# Patient Record
Sex: FEMALE | Race: WHITE | NOT HISPANIC OR LATINO | Employment: FULL TIME | URBAN - METROPOLITAN AREA
[De-identification: names, ages, dates, MRNs, and addresses within clinical notes are randomized per-mention and may not be internally consistent; named-entity substitution may affect disease eponyms.]

---

## 2022-09-23 ENCOUNTER — LAB REQUISITION (OUTPATIENT)
Dept: LAB | Facility: CLINIC | Age: 67
End: 2022-09-23
Payer: COMMERCIAL

## 2022-09-23 DIAGNOSIS — M20.12 HALLUX VALGUS (ACQUIRED), LEFT FOOT: ICD-10-CM

## 2022-09-23 PROCEDURE — 88304 TISSUE EXAM BY PATHOLOGIST: CPT | Mod: 26 | Performed by: PATHOLOGY

## 2022-09-23 PROCEDURE — 88304 TISSUE EXAM BY PATHOLOGIST: CPT | Performed by: PATHOLOGY

## 2022-09-23 PROCEDURE — 88304 TISSUE EXAM BY PATHOLOGIST: CPT | Mod: TC,ORL | Performed by: PODIATRIST

## 2022-09-27 LAB
PATH REPORT.COMMENTS IMP SPEC: NORMAL
PATH REPORT.COMMENTS IMP SPEC: NORMAL
PATH REPORT.FINAL DX SPEC: NORMAL
PATH REPORT.GROSS SPEC: NORMAL
PATH REPORT.MICROSCOPIC SPEC OTHER STN: NORMAL
PATH REPORT.RELEVANT HX SPEC: NORMAL
PHOTO IMAGE: NORMAL

## 2024-05-01 ENCOUNTER — LAB REQUISITION (OUTPATIENT)
Dept: LAB | Facility: CLINIC | Age: 69
End: 2024-05-01
Payer: COMMERCIAL

## 2024-05-01 DIAGNOSIS — Z79.899 OTHER LONG TERM (CURRENT) DRUG THERAPY: ICD-10-CM

## 2024-05-01 PROCEDURE — 87186 SC STD MICRODIL/AGAR DIL: CPT | Mod: ORL | Performed by: DERMATOLOGY

## 2024-05-04 LAB
BACTERIA WND CULT: ABNORMAL
BACTERIA WND CULT: ABNORMAL

## 2024-09-15 ENCOUNTER — HOSPITAL ENCOUNTER (EMERGENCY)
Facility: CLINIC | Age: 69
Discharge: HOME | End: 2024-09-15
Payer: COMMERCIAL

## 2024-09-15 VITALS
TEMPERATURE: 98.6 F | BODY MASS INDEX: 25.76 KG/M2 | OXYGEN SATURATION: 95 % | HEART RATE: 75 BPM | RESPIRATION RATE: 17 BRPM | DIASTOLIC BLOOD PRESSURE: 92 MMHG | HEIGHT: 68 IN | WEIGHT: 170 LBS | SYSTOLIC BLOOD PRESSURE: 158 MMHG

## 2024-09-15 DIAGNOSIS — S29.9XXD CHEST WALL INJURY, SUBSEQUENT ENCOUNTER: ICD-10-CM

## 2024-09-15 LAB
ALBUMIN UR-MCNC: NEGATIVE MG/DL
APPEARANCE UR: CLEAR
BILIRUB UR QL STRIP: NEGATIVE
COLOR UR AUTO: ABNORMAL
GLUCOSE UR STRIP-MCNC: NEGATIVE MG/DL
HGB UR QL STRIP: NEGATIVE
KETONES UR STRIP-MCNC: NEGATIVE MG/DL
LEUKOCYTE ESTERASE UR QL STRIP: ABNORMAL
NITRATE UR QL: NEGATIVE
PH UR STRIP: 5.5 [PH] (ref 5–7)
RBC URINE: 1 /HPF
SP GR UR STRIP: 1.01 (ref 1–1.03)
SQUAMOUS EPITHELIAL: <1 /HPF
UROBILINOGEN UR STRIP-MCNC: NORMAL MG/DL
WBC URINE: 4 /HPF

## 2024-09-15 PROCEDURE — 81001 URINALYSIS AUTO W/SCOPE: CPT

## 2024-09-15 PROCEDURE — 99283 EMERGENCY DEPT VISIT LOW MDM: CPT

## 2024-09-15 PROCEDURE — 250N000013 HC RX MED GY IP 250 OP 250 PS 637

## 2024-09-15 RX ORDER — LIDOCAINE 4 G/G
1 PATCH TOPICAL ONCE
Status: DISCONTINUED | OUTPATIENT
Start: 2024-09-15 | End: 2024-09-15 | Stop reason: HOSPADM

## 2024-09-15 RX ORDER — OXYCODONE HYDROCHLORIDE 5 MG/1
5 TABLET ORAL EVERY 6 HOURS PRN
Qty: 12 TABLET | Refills: 0 | Status: SHIPPED | OUTPATIENT
Start: 2024-09-15 | End: 2024-09-18

## 2024-09-15 RX ORDER — OXYCODONE HYDROCHLORIDE 5 MG/1
5 TABLET ORAL ONCE
Status: COMPLETED | OUTPATIENT
Start: 2024-09-15 | End: 2024-09-15

## 2024-09-15 RX ADMIN — LIDOCAINE 1 PATCH: 4 PATCH TOPICAL at 16:51

## 2024-09-15 RX ADMIN — OXYCODONE HYDROCHLORIDE 5 MG: 5 TABLET ORAL at 16:51

## 2024-09-15 ASSESSMENT — COLUMBIA-SUICIDE SEVERITY RATING SCALE - C-SSRS
6. HAVE YOU EVER DONE ANYTHING, STARTED TO DO ANYTHING, OR PREPARED TO DO ANYTHING TO END YOUR LIFE?: NO
2. HAVE YOU ACTUALLY HAD ANY THOUGHTS OF KILLING YOURSELF IN THE PAST MONTH?: NO
1. IN THE PAST MONTH, HAVE YOU WISHED YOU WERE DEAD OR WISHED YOU COULD GO TO SLEEP AND NOT WAKE UP?: NO

## 2024-09-15 ASSESSMENT — ACTIVITIES OF DAILY LIVING (ADL)
ADLS_ACUITY_SCORE: 35
ADLS_ACUITY_SCORE: 35

## 2024-09-15 NOTE — ED PROVIDER NOTES
"  Emergency Department Note      History of Present Illness     Chief Complaint   Rib Injury      HPI   Sue Cassidy is a 69 year old female with a history of DVT and tobacco use who presents for evaluation of a rib injury.  Patient states that yesterday around 3 PM she went to sit down when she missed the chair and hit the right side of her back against it.  She has developed significant pain over this area since then and had difficulty sleeping last night prompting presentation to urgent care today.  The patient was evaluated urgent care for rib fracture and had a negative x-ray.  However, the patient was noted to be leaning to the right and due to concern for possible neurologic compromise, the patient was sent to the ED for evaluation of TIA/CVA.  The patient has noticed that she has been leaning to the right today, but can correct this with standing and does not feel like she is veering to the right with walking.  She denies any head injury, loss of consciousness, vision changes, headache, neck pain, difficulty breathing, abdominal pain, speech difficulty, facial droop, numbness, or weakness.  She is not anticoagulated.    Independent Historian   None    Review of External Notes   9/15/24: UC note reviewed, patient sent to the emergency department due to patient leaning to the right and concern for possible TIA/CVA.  Patient had x-ray of the ribs and chest obtained which shows no findings of rib fracture or other acute cardiopulmonary pathology.    Past Medical History     Medical History and Problem List   DVT    Medications   Synthroid    Surgical History   No pertinent past surgical history.     Physical Exam     Patient Vitals for the past 24 hrs:   BP Temp Temp src Pulse Resp SpO2 Height Weight   09/15/24 1632 (!) 158/92 -- -- 75 -- -- -- --   09/15/24 1510 (!) 202/90 98.6  F (37  C) -- 86 17 95 % -- --   09/15/24 1507 (!) 202/90 98.2  F (36.8  C) Tympanic 81 21 97 % 1.727 m (5' 8\") 77.1 kg (170 lb) "     Physical Exam  General: Alert, well developed, well nourished. Cooperative.     In moderate distress  HEENT:  Head:  Atraumatic  Ears:  External ears are normal  Mouth/Throat:  Oropharynx is without erythema or exudate and mucous membranes are moist.   Eyes:   Conjunctivae normal and EOM are normal. No scleral icterus.    Pupils are equal, round, and reactive to light.   Neck:   Normal range of motion. Neck supple.  CV:  Normal rate, regular rhythm, normal heart sounds and radial pulses are 2+ and symmetric.  No murmur. TTP over the right distal posterior chest wall with mildly erythematous abrasion, no wounds or active bleeding.   Resp:  Breath sounds are clear bilaterally    Non-labored, no retractions or accessory muscle use  GI:  Abdomen is soft, no distension, no tenderness. No rebound or guarding.  No CVA tenderness bilaterally  MS:  Normal range of motion. No edema.    Back atraumatic.    No midline cervical, thoracic, or lumbar tenderness  Skin:  Warm and dry.  No rash or lesions noted.  Neuro:   Alert. Normal strength.  Sensation intact in all 4 extremities. GCS: 15    Cranial nerves 2-12 intact. No pronator drift. Normal finger nose finger, heel to shin. Torso leaning to right with walking, patient does not veer to the right with walking and can self correct lean.   Psych: Normal mood and affect.    Diagnostics     Lab Results   Labs Ordered and Resulted from Time of ED Arrival to Time of ED Departure   ROUTINE UA WITH MICROSCOPIC - Abnormal       Result Value    Color Urine Straw      Appearance Urine Clear      Glucose Urine Negative      Bilirubin Urine Negative      Ketones Urine Negative      Specific Gravity Urine 1.008      Blood Urine Negative      pH Urine 5.5      Protein Albumin Urine Negative      Urobilinogen Urine Normal      Nitrite Urine Negative      Leukocyte Esterase Urine Small (*)     RBC Urine 1      WBC Urine 4      Squamous Epithelials Urine <1       Independent Interpretation    None    ED Course      Medications Administered   Medications   Lidocaine (LIDOCARE) 4 % Patch 1 patch (1 patch Transdermal $Patch/Med Applied 9/15/24 1651)   oxyCODONE (ROXICODONE) tablet 5 mg (5 mg Oral $Given 9/15/24 1651)       Procedures   Procedures     Discussion of Management   None    ED Course        Additional Documentation  None    Medical Decision Making / Diagnosis     CMS Diagnoses: None    MIPS       None    MDM   Sue Cassidy is a 69 year old female with a history of DVT and tobacco use who presents for evaluation of a rib injury.  On exam, the patient had tenderness over the right posterior distal rib and was noted to be leaning to the right with her torso.  Her neurologic exam is otherwise normal and she does not have any focal neurologic deficits.  Vital signs show elevated blood pressure which improved throughout ED course without fever, tachycardia, or hypoxia.  UA shows no evidence of hematuria and therefore I have lower suspicion for acute kidney injury from the fall.  The patient does not have any abdominal pain I have low suspicion for acute intra-abdominal injury as well.  We discussed that although the patient was noted to be leaning to the right, her gait did not appear affected and she did not have any focal neurologic deficits.  Signs and symptoms would not be consistent with TIA/CVA and we offered further workup at this in the case that this is an atypical presentation, however, the patient declined and I think this is reasonable given that the likelihood of acute intracranial pathology is very low.  The patient was provided with pain medication here and a prescription for home for treatment of rib contusion.  She was also given a spirometer and advised to use this multiple times a day to ensure adequate deep breaths.  We discussed the risk of pneumonia if the breasts are not maintained throughout the healing process.  She was advised to follow-up with her primary care provider  within the next few days for reevaluation and for reassessment of both the rib injury and the leaning.  She was advised to continue to monitor symptoms closely and return for fever, vision changes, headache, facial droop, speech difficulty, increased chest pain, difficulty breathing, vomiting, abdominal pain, unilateral numbness/weakness, ataxia, or any other new concerns.  The patient was comfortable with this plan and all questions were answered.    Disposition   The patient was discharged.     Diagnosis     ICD-10-CM    1. Chest wall injury, subsequent encounter  S29.9XXD Other Respiratory Supplies           Discharge Medications   Discharge Medication List as of 9/15/2024  4:57 PM        START taking these medications    Details   oxyCODONE (ROXICODONE) 5 MG tablet Take 1 tablet (5 mg) by mouth every 6 hours as needed for moderate to severe pain., Disp-12 tablet, R-0, Local Print               HOA Fraga Carley J, PA-C  09/15/24 5928

## 2024-09-15 NOTE — DISCHARGE INSTRUCTIONS
Return to ED for speech difficulty, facial droop, difficulty walking, numbness, weakness, or any other new concerns  Take tylenol/ibuprofen as needed for pain, use lidocaine patches as needed  Take oxycodone for moderate to severe pain  Use spirometer a few times per day    Discharge Instructions  Chest Injury    You have been seen today because of a chest injury.  You may have contusion (bruise) of the chest or a rib fracture (broken bone).  Rib fractures can be hard to see on x-ray, so we cannot always be sure whether your rib is broken or bruised. Fortunately, the treatment of these injuries is usually the same, and includes pain control and preventing complications.    Generally, every Emergency Department visit should have a follow-up clinic visit with either a primary or a specialty clinic/provider. Please follow-up as instructed by your emergency provider today.    Return to the Emergency Department if:  You become short of breath.  You develop a fever over 101.5 F.  You pass out or become very weak or pale.  You have abdominal (belly) pain that is new or increasing.  You cough up blood.  You have new symptoms or anything that worries you.    Follow-up with your provider:  As directed by your provider today.  If you are not improved in two weeks.  If you need more pain medicine, since we do not refill pain pills through the Emergency Department.    Home care instructions:  Chest injuries can be painful.  You may take an over-the-counter pain medication such as Tylenol  (acetaminophen), Advil  (ibuprofen), Motrin  (ibuprofen) or Aleve  (naproxen).  Applying ice packs to the painful area can help your pain.   Holding a pillow against your chest can help with pain when you need to move or cough.  You may need to rest and avoid lifting particularly in the first few days after your injury.  Prevention of pneumonia (lung infection) is also a part of managing chest injuries.  Because it can hurt to take deep  breaths, you could develop collapsed areas of lung that can develop infection.  To prevent this, you need to take ten very deep breaths every hour while you are awake. Sometimes you will be given a device called an incentive spirometer to help with this. You also need to make yourself cough every hour.  Rib belts or binders are not generally recommended, since they may increase the risk of pneumonia. If you do use one, use it for only short periods of time.   If you were given a prescription for medicine here today, be sure to read all of the information (including the package insert) that comes with your prescription.  This will include important information about the medicine, its side effects, and any warnings that you need to know about.  The pharmacist who fills the prescription can provide more information and answer questions you may have about the medicine.  If you have questions or concerns that the pharmacist cannot address, please call or return to the Emergency Department.   Remember that you can always come back to the Emergency Department if you are not able to see your regular provider in the amount of time listed above, if you get any new symptoms, or if there is anything that worries you.

## 2024-09-15 NOTE — ED TRIAGE NOTES
Pt presents to ed to be evaluated for rib pain.   Yesterday pt was sitting down in the chair, and went to sit down, and the chair feel, and she fell onto her R ribs, and her back.   Pt was seen at , and they did a xray and there was no evidence of fx. They noticed that the patient seems to lean to the right when sitting in the chair towards the injured side, but is able to straighten when asked to. Because of this, pt was sent here for further eval. No focal neuro deficits in triage. Pt denies hitting her head yesterday, and is not on a blood thinner.      Triage Assessment (Adult)       Row Name 09/15/24 0887          Triage Assessment    Airway WDL WDL        Peripheral/Neurovascular WDL    Peripheral Neurovascular WDL WDL